# Patient Record
Sex: MALE | URBAN - METROPOLITAN AREA
[De-identification: names, ages, dates, MRNs, and addresses within clinical notes are randomized per-mention and may not be internally consistent; named-entity substitution may affect disease eponyms.]

---

## 2022-11-30 ENCOUNTER — APPOINTMENT (OUTPATIENT)
Dept: URBAN - METROPOLITAN AREA CLINIC 197 | Age: 57
Setting detail: DERMATOLOGY
End: 2022-12-01

## 2022-11-30 DIAGNOSIS — L08.9 LOCAL INFECTION OF THE SKIN AND SUBCUTANEOUS TISSUE, UNSPECIFIED: ICD-10-CM

## 2022-11-30 DIAGNOSIS — L738 OTHER SPECIFIED DISEASES OF HAIR AND HAIR FOLLICLES: ICD-10-CM

## 2022-11-30 DIAGNOSIS — L663 OTHER SPECIFIED DISEASES OF HAIR AND HAIR FOLLICLES: ICD-10-CM

## 2022-11-30 PROBLEM — L02.821 FURUNCLE OF HEAD [ANY PART, EXCEPT FACE]: Status: ACTIVE | Noted: 2022-11-30

## 2022-11-30 PROCEDURE — OTHER COUNSELING: OTHER

## 2022-11-30 PROCEDURE — OTHER MIPS QUALITY: OTHER

## 2022-11-30 PROCEDURE — OTHER PRESCRIPTION: OTHER

## 2022-11-30 PROCEDURE — OTHER MEDICATION COUNSELING: OTHER

## 2022-11-30 PROCEDURE — OTHER PRESCRIPTION MEDICATION MANAGEMENT: OTHER

## 2022-11-30 PROCEDURE — OTHER ORDER TESTS: OTHER

## 2022-11-30 PROCEDURE — 99213 OFFICE O/P EST LOW 20 MIN: CPT

## 2022-11-30 PROCEDURE — OTHER PHOTO-DOCUMENTATION: OTHER

## 2022-11-30 RX ORDER — DOXYCYCLINE HYCLATE 50 MG/1
CAPSULE, GELATIN COATED ORAL
Qty: 90 | Refills: 0 | Status: ERX | COMMUNITY
Start: 2022-11-30

## 2022-11-30 ASSESSMENT — LOCATION DETAILED DESCRIPTION DERM: LOCATION DETAILED: MID-OCCIPITAL SCALP

## 2022-11-30 ASSESSMENT — LOCATION ZONE DERM: LOCATION ZONE: SCALP

## 2022-11-30 ASSESSMENT — LOCATION SIMPLE DESCRIPTION DERM: LOCATION SIMPLE: POSTERIOR SCALP

## 2022-11-30 NOTE — HPI: SKIN LESIONS
How Severe Is Your Skin Lesion?: moderate
Have Your Skin Lesions Been Treated?: not been treated
Is This A New Presentation, Or A Follow-Up?: Skin Lesions
Additional History: Had since July. Saw PCP. Gave cephalexin.-worked but came back.

## 2022-11-30 NOTE — PROCEDURE: MEDICATION COUNSELING
dizziness/chest pain Erivedge Counseling- I discussed with the patient the risks of Erivedge including but not limited to nausea, vomiting, diarrhea, constipation, weight loss, changes in the sense of taste, decreased appetite, muscle spasms, and hair loss.  The patient verbalized understanding of the proper use and possible adverse effects of Erivedge.  All of the patient's questions and concerns were addressed.

## 2022-11-30 NOTE — PROCEDURE: PRESCRIPTION MEDICATION MANAGEMENT
Plan: Pt reports +/- photosensitivity reaction to doxycycline as a child, does not have an allergy to the medication. I did offer to treat with Clobetasol topically instead, but he was comfortable with the doxycycline

## 2022-11-30 NOTE — PROCEDURE: MEDICATION COUNSELING
DISPLAY PLAN FREE TEXT Dupixent Counseling: I discussed with the patient the risks of dupilumab including but not limited to eye infection and irritation, cold sores, injection site reactions, worsening of asthma, allergic reactions and increased risk of parasitic infection.  Live vaccines should be avoided while taking dupilumab. Dupilumab will also interact with certain medications such as warfarin and cyclosporine. The patient understands that monitoring is required and they must alert us or the primary physician if symptoms of infection or other concerning signs are noted.

## 2022-11-30 NOTE — PROCEDURE: PRESCRIPTION MEDICATION MANAGEMENT
Initiate Treatment: doxycycline hyclate 50 mg capsule \\nQuantity: 90.0 Capsule\\nSi po qd WITH FOOD\\n\\nBactroban markus. 22 grams \\nSig: apply tid x 10 days\\nPt already has this. PA rx’ed it to him back in 2022.

## 2022-11-30 NOTE — PROCEDURE: MEDICATION COUNSELING
DC instructions Doxycycline Pregnancy And Lactation Text: This medication is Pregnancy Category D and not consider safe during pregnancy. It is also excreted in breast milk but is considered safe for shorter treatment courses.

## 2022-11-30 NOTE — PROCEDURE: MEDICATION COUNSELING
Xelfrancez Pregnancy And Lactation Text: This medication is Pregnancy Category D and is not considered safe during pregnancy.  The risk during breast feeding is also uncertain.

## 2022-11-30 NOTE — PROCEDURE: MEDICATION COUNSELING
Suture Removal: 10 days Tranexamic Acid Counseling:  Patient advised of the small risk of bleeding problems with tranexamic acid. They were also instructed to call if they developed any nausea, vomiting or diarrhea. All of the patient's questions and concerns were addressed.

## 2022-11-30 NOTE — PROCEDURE: MEDICATION COUNSELING
,DirectAddress_Unknown Hydroxychloroquine Pregnancy And Lactation Text: This medication has been shown to cause fetal harm but it isn't assigned a Pregnancy Risk Category. There are small amounts excreted in breast milk.

## 2022-12-21 ENCOUNTER — APPOINTMENT (OUTPATIENT)
Dept: URBAN - METROPOLITAN AREA CLINIC 197 | Age: 57
Setting detail: DERMATOLOGY
End: 2022-12-22

## 2022-12-21 DIAGNOSIS — L663 OTHER SPECIFIED DISEASES OF HAIR AND HAIR FOLLICLES: ICD-10-CM

## 2022-12-21 DIAGNOSIS — L738 OTHER SPECIFIED DISEASES OF HAIR AND HAIR FOLLICLES: ICD-10-CM

## 2022-12-21 PROBLEM — L02.821 FURUNCLE OF HEAD [ANY PART, EXCEPT FACE]: Status: ACTIVE | Noted: 2022-12-21

## 2022-12-21 PROCEDURE — OTHER LAB REPORTS REVIEWED: OTHER

## 2022-12-21 PROCEDURE — OTHER PRESCRIPTION MEDICATION MANAGEMENT: OTHER

## 2022-12-21 PROCEDURE — OTHER COUNSELING: OTHER

## 2022-12-21 PROCEDURE — 99213 OFFICE O/P EST LOW 20 MIN: CPT

## 2022-12-21 PROCEDURE — OTHER MIPS QUALITY: OTHER

## 2022-12-21 ASSESSMENT — LOCATION SIMPLE DESCRIPTION DERM: LOCATION SIMPLE: POSTERIOR SCALP

## 2022-12-21 ASSESSMENT — LOCATION DETAILED DESCRIPTION DERM: LOCATION DETAILED: MID-OCCIPITAL SCALP

## 2022-12-21 ASSESSMENT — LOCATION ZONE DERM: LOCATION ZONE: SCALP

## 2022-12-21 NOTE — PROCEDURE: LAB REPORTS REVIEWED
Labs Reviewed Override: Felisha
Summarized Lab Results: +staph treated with doxy and mupiricin
Detail Level: Zone

## 2022-12-21 NOTE — PROCEDURE: PRESCRIPTION MEDICATION MANAGEMENT
Detail Level: Zone
Render In Strict Bullet Format?: No
Plan: If pt flares, he may resume 50 mg 1 PO QD with food, understands he will need appt for refills
Discontinue Regimen: Doxycycline 50 mg po qd with food

## 2022-12-21 NOTE — HPI: FOLLOW-UP
What Is The Condition That You Are Returning For Follow-Up?: other
Additional History: Pt is here for a follicullitis f/u and states that he is doing better on the doxycycline 50mg

## 2023-04-24 ENCOUNTER — APPOINTMENT (OUTPATIENT)
Dept: URBAN - METROPOLITAN AREA CLINIC 197 | Age: 58
Setting detail: DERMATOLOGY
End: 2023-04-24

## 2023-04-24 DIAGNOSIS — L663 OTHER SPECIFIED DISEASES OF HAIR AND HAIR FOLLICLES: ICD-10-CM

## 2023-04-24 DIAGNOSIS — L738 OTHER SPECIFIED DISEASES OF HAIR AND HAIR FOLLICLES: ICD-10-CM

## 2023-04-24 PROBLEM — L02.821 FURUNCLE OF HEAD [ANY PART, EXCEPT FACE]: Status: ACTIVE | Noted: 2023-04-24

## 2023-04-24 PROCEDURE — OTHER PRESCRIPTION: OTHER

## 2023-04-24 PROCEDURE — OTHER COUNSELING: OTHER

## 2023-04-24 PROCEDURE — 99213 OFFICE O/P EST LOW 20 MIN: CPT

## 2023-04-24 PROCEDURE — OTHER MEDICATION COUNSELING: OTHER

## 2023-04-24 PROCEDURE — OTHER PRESCRIPTION MEDICATION MANAGEMENT: OTHER

## 2023-04-24 PROCEDURE — OTHER MIPS QUALITY: OTHER

## 2023-04-24 RX ORDER — DOXYCYCLINE HYCLATE 50 MG/1
CAPSULE, GELATIN COATED ORAL
Qty: 90 | Refills: 1 | Status: ERX

## 2023-04-24 ASSESSMENT — LOCATION SIMPLE DESCRIPTION DERM: LOCATION SIMPLE: POSTERIOR SCALP

## 2023-04-24 ASSESSMENT — LOCATION ZONE DERM: LOCATION ZONE: SCALP

## 2023-04-24 ASSESSMENT — LOCATION DETAILED DESCRIPTION DERM: LOCATION DETAILED: MID-OCCIPITAL SCALP

## 2023-04-24 NOTE — PROCEDURE: PRESCRIPTION MEDICATION MANAGEMENT
Detail Level: Zone
Render In Strict Bullet Format?: No
Continue Regimen: Doxycycline 50 mg po qd with food
